# Patient Record
Sex: FEMALE | NOT HISPANIC OR LATINO
[De-identification: names, ages, dates, MRNs, and addresses within clinical notes are randomized per-mention and may not be internally consistent; named-entity substitution may affect disease eponyms.]

---

## 2020-12-14 ENCOUNTER — RX ONLY (OUTPATIENT)
Age: 60
Setting detail: RX ONLY
End: 2020-12-14

## 2022-02-14 ENCOUNTER — RX ONLY (OUTPATIENT)
Age: 62
Setting detail: RX ONLY
End: 2022-02-14

## 2022-12-21 ENCOUNTER — OFFICE (OUTPATIENT)
Dept: URBAN - METROPOLITAN AREA CLINIC 29 | Facility: CLINIC | Age: 62
Setting detail: OPHTHALMOLOGY
End: 2022-12-21
Payer: COMMERCIAL

## 2022-12-21 DIAGNOSIS — H43.393: ICD-10-CM

## 2022-12-21 DIAGNOSIS — H01.005: ICD-10-CM

## 2022-12-21 DIAGNOSIS — H01.002: ICD-10-CM

## 2022-12-21 DIAGNOSIS — H43.813: ICD-10-CM

## 2022-12-21 PROCEDURE — 92202 OPSCPY EXTND ON/MAC DRAW: CPT | Performed by: OPHTHALMOLOGY

## 2022-12-21 PROCEDURE — 92014 COMPRE OPH EXAM EST PT 1/>: CPT | Performed by: OPHTHALMOLOGY

## 2022-12-21 ASSESSMENT — REFRACTION_AUTOREFRACTION
OD_SPHERE: +1.00
OS_CYLINDER: +0.25
OD_CYLINDER: +0.50
OS_AXIS: 011
OD_AXIS: 009
OS_SPHERE: +1.25

## 2022-12-21 ASSESSMENT — REFRACTION_MANIFEST
OS_SPHERE: +1.25
OD_SPHERE: +1.00
OS_SPHERE: +0.75
OD_ADD: +2.50
OS_VA1: 20/25
OD_SPHERE: +1.00
OS_ADD: +2.50
OD_VA1: 20/30
OS_ADD: +2.25
OD_CYLINDER: +0.50
OD_ADD: +2.25
OD_AXIS: 10

## 2022-12-21 ASSESSMENT — KERATOMETRY
OS_K2POWER_DIOPTERS: +0.25
OS_K1POWER_DIOPTERS: +0.75
OS_AXISANGLE_DEGREES: 158
OD_K1POWER_DIOPTERS: +0.50
OD_AXISANGLE_DEGREES: 178
OD_K2POWER_DIOPTERS: +0.50

## 2022-12-21 ASSESSMENT — VISUAL ACUITY
OD_BCVA: 20/25
OS_BCVA: 20/30-1

## 2022-12-21 ASSESSMENT — AXIALLENGTH_DERIVED
OD_AL: 67.18
OS_AL: 66.79
OD_AL: 67.18

## 2022-12-21 ASSESSMENT — REFRACTION_CURRENTRX
OS_ADD: +2.25
OS_CYLINDER: SPH
OS_CYLINDER: SPH
OD_CYLINDER: SPH
OD_SPHERE: +2.75
OD_CYLINDER: SPH
OD_ADD: +2.25
OD_OVR_VA: 20/
OS_OVR_VA: 20/
OS_SPHERE: +2.75
OD_SPHERE: +1.00
OS_OVR_VA: 20/
OD_OVR_VA: 20/
OS_SPHERE: +0.50

## 2022-12-21 ASSESSMENT — TONOMETRY
OD_IOP_MMHG: 13
OS_IOP_MMHG: 11

## 2022-12-21 ASSESSMENT — SPHEQUIV_DERIVED
OD_SPHEQUIV: 1.25
OD_SPHEQUIV: 1.25
OS_SPHEQUIV: 1.375

## 2022-12-21 ASSESSMENT — CONFRONTATIONAL VISUAL FIELD TEST (CVF)
OD_FINDINGS: FULL
OS_FINDINGS: FULL

## 2022-12-21 ASSESSMENT — LID EXAM ASSESSMENTS
OS_BLEPHARITIS: T
OD_BLEPHARITIS: T

## 2023-06-26 ENCOUNTER — OFFICE (OUTPATIENT)
Dept: URBAN - METROPOLITAN AREA CLINIC 29 | Facility: CLINIC | Age: 63
Setting detail: OPHTHALMOLOGY
End: 2023-06-26
Payer: COMMERCIAL

## 2023-06-26 DIAGNOSIS — H35.372: ICD-10-CM

## 2023-06-26 DIAGNOSIS — H43.812: ICD-10-CM

## 2023-06-26 DIAGNOSIS — H01.005: ICD-10-CM

## 2023-06-26 DIAGNOSIS — H01.002: ICD-10-CM

## 2023-06-26 DIAGNOSIS — H43.393: ICD-10-CM

## 2023-06-26 PROCEDURE — 92134 CPTRZ OPH DX IMG PST SGM RTA: CPT | Performed by: OPHTHALMOLOGY

## 2023-06-26 PROCEDURE — 92014 COMPRE OPH EXAM EST PT 1/>: CPT | Performed by: OPHTHALMOLOGY

## 2023-06-26 ASSESSMENT — REFRACTION_MANIFEST
OD_ADD: +2.25
OD_CYLINDER: +0.50
OS_SPHERE: +1.25
OD_AXIS: 10
OD_SPHERE: +1.00
OS_VA1: 20/25
OS_SPHERE: +0.75
OD_ADD: +2.50
OS_ADD: +2.50
OD_SPHERE: +1.00
OD_VA1: 20/30
OS_ADD: +2.25

## 2023-06-26 ASSESSMENT — REFRACTION_CURRENTRX
OD_SPHERE: +2.75
OS_OVR_VA: 20/
OS_ADD: +2.25
OD_ADD: +2.25
OS_OVR_VA: 20/
OS_SPHERE: +0.50
OD_CYLINDER: SPH
OD_OVR_VA: 20/
OD_CYLINDER: SPH
OD_OVR_VA: 20/
OS_SPHERE: +2.75
OD_SPHERE: +1.00
OS_CYLINDER: SPH
OS_CYLINDER: SPH

## 2023-06-26 ASSESSMENT — REFRACTION_AUTOREFRACTION
OS_CYLINDER: +0.25
OD_CYLINDER: +0.50
OS_SPHERE: +1.25
OS_AXIS: 011
OD_AXIS: 009
OD_SPHERE: +1.00

## 2023-06-26 ASSESSMENT — KERATOMETRY
OS_AXISANGLE_DEGREES: 158
OD_K2POWER_DIOPTERS: +0.50
OS_K2POWER_DIOPTERS: +0.25
OD_K1POWER_DIOPTERS: +0.50
OS_K1POWER_DIOPTERS: +0.75
OD_AXISANGLE_DEGREES: 178

## 2023-06-26 ASSESSMENT — CONFRONTATIONAL VISUAL FIELD TEST (CVF)
OS_FINDINGS: FULL
OD_FINDINGS: FULL

## 2023-06-26 ASSESSMENT — SPHEQUIV_DERIVED
OD_SPHEQUIV: 1.25
OS_SPHEQUIV: 1.375
OD_SPHEQUIV: 1.25

## 2023-06-26 ASSESSMENT — VISUAL ACUITY
OD_BCVA: 20/20-1
OS_BCVA: 20/20

## 2023-06-26 ASSESSMENT — LID EXAM ASSESSMENTS
OD_BLEPHARITIS: T
OS_BLEPHARITIS: T

## 2023-06-26 ASSESSMENT — AXIALLENGTH_DERIVED
OD_AL: 67.18
OD_AL: 67.18
OS_AL: 66.79

## 2024-02-15 ENCOUNTER — APPOINTMENT (RX ONLY)
Dept: URBAN - METROPOLITAN AREA CLINIC 94 | Facility: CLINIC | Age: 64
Setting detail: DERMATOLOGY
End: 2024-02-15

## 2024-02-15 DIAGNOSIS — L82.1 OTHER SEBORRHEIC KERATOSIS: ICD-10-CM

## 2024-02-15 DIAGNOSIS — Z85.828 PERSONAL HISTORY OF OTHER MALIGNANT NEOPLASM OF SKIN: ICD-10-CM

## 2024-02-15 DIAGNOSIS — L57.0 ACTINIC KERATOSIS: ICD-10-CM

## 2024-02-15 DIAGNOSIS — B07.8 OTHER VIRAL WARTS: ICD-10-CM

## 2024-02-15 DIAGNOSIS — Z41.9 ENCOUNTER FOR PROCEDURE FOR PURPOSES OTHER THAN REMEDYING HEALTH STATE, UNSPECIFIED: ICD-10-CM

## 2024-02-15 PROCEDURE — 17110 DESTRUCTION B9 LES UP TO 14: CPT

## 2024-02-15 PROCEDURE — 99203 OFFICE O/P NEW LOW 30 MIN: CPT | Mod: 25

## 2024-02-15 PROCEDURE — ? ORDER FOR PHOTODYNAMIC THERAPY

## 2024-02-15 PROCEDURE — ? COUNSELING

## 2024-02-15 PROCEDURE — 11056 PARNG/CUTG B9 HYPRKR LES 2-4: CPT | Mod: 59

## 2024-02-15 PROCEDURE — ? PARING HYPERKERATOTIC LESION

## 2024-02-15 PROCEDURE — 17000 DESTRUCT PREMALG LESION: CPT | Mod: 59

## 2024-02-15 PROCEDURE — ? ADDITIONAL NOTES

## 2024-02-15 PROCEDURE — ? LIQUID NITROGEN

## 2024-02-15 PROCEDURE — ? COSMETIC CONSULTATION: WRINKLES

## 2024-02-15 ASSESSMENT — LOCATION SIMPLE DESCRIPTION DERM
LOCATION SIMPLE: RIGHT CHEEK
LOCATION SIMPLE: LEFT CHEEK
LOCATION SIMPLE: CHIN
LOCATION SIMPLE: LEFT LIP
LOCATION SIMPLE: LEFT FOREHEAD
LOCATION SIMPLE: RIGHT LIP
LOCATION SIMPLE: LEFT THUMB
LOCATION SIMPLE: LEFT THIGH

## 2024-02-15 ASSESSMENT — LOCATION ZONE DERM
LOCATION ZONE: FACE
LOCATION ZONE: LIP
LOCATION ZONE: LEG
LOCATION ZONE: FINGER

## 2024-02-15 ASSESSMENT — LOCATION DETAILED DESCRIPTION DERM
LOCATION DETAILED: RIGHT UPPER CUTANEOUS LIP
LOCATION DETAILED: RIGHT LOWER CUTANEOUS LIP
LOCATION DETAILED: RIGHT INFERIOR CENTRAL MALAR CHEEK
LOCATION DETAILED: LEFT DISTAL RADIAL THUMB
LOCATION DETAILED: LEFT ANTERIOR DISTAL THIGH
LOCATION DETAILED: LEFT CHIN
LOCATION DETAILED: LEFT INFERIOR CENTRAL MALAR CHEEK
LOCATION DETAILED: LEFT LOWER CUTANEOUS LIP
LOCATION DETAILED: LEFT INFERIOR MEDIAL FOREHEAD
LOCATION DETAILED: LEFT NASOLABIAL FOLD
LOCATION DETAILED: LEFT FOREHEAD
LOCATION DETAILED: LEFT CENTRAL MALAR CHEEK
LOCATION DETAILED: RIGHT SUPERIOR MEDIAL BUCCAL CHEEK

## 2024-02-15 NOTE — PROCEDURE: ORDER FOR PHOTODYNAMIC THERAPY
Face And Scalp Incubation Time: 1 Hour for the face and 2 Hours for the scalp
Frequency Of Pdt: Every 6 months if needed
Face Incubation Time: 1 Hour
Legs Incubation Time: 2 Hours
Pdt Type: ANNELIESE-U
Consent: The procedure and risks were reviewed with the patient including but not limited to: burning, pigmentary changes, pain, blistering, scabbing, redness, and the possibility of needing numerous treatments. Strict photoprotection after the procedure was also discussed.
Photosensitizer: Levulan
Detail Level: Simple
Location: Face
Occlusion: No

## 2024-02-15 NOTE — PROCEDURE: COUNSELING
Detail Level: Simple
Detail Level: Zone
Patient Specific Counseling (Will Not Stick From Patient To Patient): Courtesy LN 2 x 1 spot. Courtesy

## 2024-02-15 NOTE — PROCEDURE: LIQUID NITROGEN
Medical Necessity Information: It is in your best interest to select a reason for this procedure from the list below. All of these items fulfill various CMS LCD requirements except the new and changing color options.
Render Note In Bullet Format When Appropriate: No
Show Spray Paint Technique Variable?: Yes
Post-Care Instructions: I reviewed with the patient in detail post-care instructions. Patient is to wear sunprotection, and avoid picking at any of the treated lesions. Pt may apply Vaseline to crusted or scabbing areas.
Spray Paint Text: The liquid nitrogen was applied to the skin utilizing a spray paint frosting technique.
Consent: The patient's consent was obtained including but not limited to risks of crusting, scabbing, blistering, scarring, darker or lighter pigmentary change, recurrence, incomplete removal and infection.
Medical Necessity Clause: This procedure was medically necessary because the lesions that were treated were:
Detail Level: Detailed
Duration Of Freeze Thaw-Cycle (Seconds): 2
Number Of Freeze-Thaw Cycles: 1 freeze-thaw cycle

## 2024-02-15 NOTE — PROCEDURE: ADDITIONAL NOTES
Render Risk Assessment In Note?: no
Detail Level: Simple
Additional Notes: Patient stated she wants to do her research before she decides whether or not Blue light is something she would like to do.

## 2024-02-15 NOTE — PROCEDURE: PARING HYPERKERATOTIC LESION
Medical Necessity Clause: This procedure was medically necessary because the patient has pain associated with lesion
Medical Necessity Information: LCD Guidelines vary from payer to payer. Please check with your payer's policy to determine medical necessity. Many payers require at least 1 Class A indication, 2 Class B indications or 1 Class B and 2 Class C to qualify for insurance payment.
Paring Method: 15 blade scalpel

## 2024-07-30 ENCOUNTER — OFFICE (OUTPATIENT)
Dept: URBAN - METROPOLITAN AREA CLINIC 29 | Facility: CLINIC | Age: 64
Setting detail: OPHTHALMOLOGY
End: 2024-07-30
Payer: COMMERCIAL

## 2024-07-30 DIAGNOSIS — H35.372: ICD-10-CM

## 2024-07-30 DIAGNOSIS — H52.03: ICD-10-CM

## 2024-07-30 DIAGNOSIS — H01.005: ICD-10-CM

## 2024-07-30 DIAGNOSIS — H25.13: ICD-10-CM

## 2024-07-30 DIAGNOSIS — H01.002: ICD-10-CM

## 2024-07-30 DIAGNOSIS — H43.393: ICD-10-CM

## 2024-07-30 DIAGNOSIS — H52.4: ICD-10-CM

## 2024-07-30 PROCEDURE — 92014 COMPRE OPH EXAM EST PT 1/>: CPT | Performed by: OPHTHALMOLOGY

## 2024-07-30 PROCEDURE — 92015 DETERMINE REFRACTIVE STATE: CPT | Performed by: OPHTHALMOLOGY

## 2024-07-30 ASSESSMENT — LID EXAM ASSESSMENTS
OD_BLEPHARITIS: T
OS_BLEPHARITIS: T

## 2024-07-30 ASSESSMENT — CONFRONTATIONAL VISUAL FIELD TEST (CVF)
OD_FINDINGS: FULL
OS_FINDINGS: FULL

## 2024-07-31 ENCOUNTER — OFFICE (OUTPATIENT)
Dept: URBAN - METROPOLITAN AREA CLINIC 29 | Facility: CLINIC | Age: 64
Setting detail: OPHTHALMOLOGY
End: 2024-07-31
Payer: COMMERCIAL

## 2024-07-31 DIAGNOSIS — H52.03: ICD-10-CM

## 2024-07-31 PROBLEM — H25.13 CATARACT SENILE NUCLEAR SCLEROSIS; BOTH EYES: Status: ACTIVE | Noted: 2024-07-30

## 2024-07-31 PROCEDURE — 92310 CONTACT LENS FITTING OU: CPT | Performed by: OPTOMETRIST

## 2024-07-31 ASSESSMENT — LID EXAM ASSESSMENTS
OS_BLEPHARITIS: T
OD_BLEPHARITIS: T

## 2024-07-31 ASSESSMENT — CONFRONTATIONAL VISUAL FIELD TEST (CVF)
OD_FINDINGS: FULL
OS_FINDINGS: FULL